# Patient Record
Sex: MALE | Race: WHITE | NOT HISPANIC OR LATINO | Employment: FULL TIME | URBAN - NONMETROPOLITAN AREA
[De-identification: names, ages, dates, MRNs, and addresses within clinical notes are randomized per-mention and may not be internally consistent; named-entity substitution may affect disease eponyms.]

---

## 2022-10-05 ENCOUNTER — OFFICE VISIT (OUTPATIENT)
Dept: INTERNAL MEDICINE | Facility: CLINIC | Age: 62
End: 2022-10-05

## 2022-10-05 VITALS
BODY MASS INDEX: 31.22 KG/M2 | HEART RATE: 72 BPM | SYSTOLIC BLOOD PRESSURE: 150 MMHG | HEIGHT: 68 IN | OXYGEN SATURATION: 97 % | DIASTOLIC BLOOD PRESSURE: 90 MMHG | WEIGHT: 206 LBS | TEMPERATURE: 98 F

## 2022-10-05 DIAGNOSIS — R94.31 ABNORMAL EKG: ICD-10-CM

## 2022-10-05 DIAGNOSIS — K51.919 ULCERATIVE COLITIS WITH COMPLICATION, UNSPECIFIED LOCATION: ICD-10-CM

## 2022-10-05 DIAGNOSIS — Z23 NEED FOR INFLUENZA VACCINATION: ICD-10-CM

## 2022-10-05 DIAGNOSIS — I10 PRIMARY HYPERTENSION: Primary | ICD-10-CM

## 2022-10-05 DIAGNOSIS — R06.09 DYSPNEA ON EXERTION: ICD-10-CM

## 2022-10-05 PROCEDURE — 90686 IIV4 VACC NO PRSV 0.5 ML IM: CPT | Performed by: FAMILY MEDICINE

## 2022-10-05 PROCEDURE — 90471 IMMUNIZATION ADMIN: CPT | Performed by: FAMILY MEDICINE

## 2022-10-05 PROCEDURE — 99204 OFFICE O/P NEW MOD 45 MIN: CPT | Performed by: FAMILY MEDICINE

## 2022-10-05 RX ORDER — LOSARTAN POTASSIUM 50 MG/1
50 TABLET ORAL DAILY
COMMUNITY
End: 2022-10-05 | Stop reason: SDUPTHER

## 2022-10-05 RX ORDER — LOSARTAN POTASSIUM 50 MG/1
50 TABLET ORAL DAILY
Qty: 90 TABLET | Refills: 1 | Status: SHIPPED | OUTPATIENT
Start: 2022-10-05 | End: 2023-02-12 | Stop reason: SDUPTHER

## 2022-10-05 RX ORDER — AMLODIPINE BESYLATE 10 MG/1
10 TABLET ORAL DAILY
Qty: 90 TABLET | Refills: 1 | Status: SHIPPED | OUTPATIENT
Start: 2022-10-05 | End: 2023-02-27

## 2022-10-05 RX ORDER — MESALAMINE 0.38 G/1
375 CAPSULE, EXTENDED RELEASE ORAL DAILY
Qty: 90 CAPSULE | Refills: 0 | Status: SHIPPED | OUTPATIENT
Start: 2022-10-05 | End: 2022-10-05 | Stop reason: SDUPTHER

## 2022-10-05 RX ORDER — AMLODIPINE BESYLATE 5 MG/1
5 TABLET ORAL DAILY
COMMUNITY
End: 2022-10-05 | Stop reason: SDUPTHER

## 2022-10-05 RX ORDER — MESALAMINE 0.38 G/1
375 CAPSULE, EXTENDED RELEASE ORAL DAILY
Qty: 30 CAPSULE | Refills: 0 | Status: SHIPPED | OUTPATIENT
Start: 2022-10-05 | End: 2022-11-23 | Stop reason: SDUPTHER

## 2022-10-05 RX ORDER — MESALAMINE 0.38 G/1
CAPSULE, EXTENDED RELEASE ORAL
COMMUNITY
Start: 2022-09-29 | End: 2022-10-05 | Stop reason: SDUPTHER

## 2022-10-05 RX ORDER — TADALAFIL 20 MG/1
20 TABLET ORAL DAILY PRN
COMMUNITY

## 2022-10-05 NOTE — PROGRESS NOTES
"Dakota Garcia is a 62 y.o. male.    Chief Complaint   Patient presents with   • Hypertension       HPI   Patient has hypertension.  They are taking Losartan, Metoprolol and amlodipine.  They have been compliant with medications.  The patient denies any side effects to the medication.  Blood pressure is not controlled in the office today.  Blood pressure has been running high at home.  They are following a low salt diet.  They are active.  He has headaches all the time and reports decreased stamina since starting metoprolol.  He states he was put on metoprolol due to \"bad EKG\" 2 years ago.  He denies chest pain.  Admits to feeling like heart races at times.  Will occasionally have lump in his throat when heart races and is  short of breath during episodes.  Sometimes will occur on exertion.      He has had several colonoscopies.  He was started on mesalamine for ulcerative colitis after his last colonoscopy.  Admits to diarrhea with UC flares. He has been out of the medication for a few weeks.  He does feel like the medication helps to control his symptoms.     The following portions of the patient's history were reviewed and updated as appropriate: allergies, current medications, past family history, past medical history, past social history, past surgical history and problem list.     Past Medical History:   Diagnosis Date   • HTN (hypertension)    • Ulcerative colitis (HCC)        Past Surgical History:   Procedure Laterality Date   • COLONOSCOPY     • VASECTOMY         Family History   Problem Relation Age of Onset   • Thyroid disease Father    • Other Maternal Grandfather         pheochromocytoma   • Stroke Maternal Grandfather        Social History     Socioeconomic History   • Marital status: Significant Other   Tobacco Use   • Smoking status: Never Smoker   • Smokeless tobacco: Never Used   Vaping Use   • Vaping Use: Never used   Substance and Sexual Activity   • Alcohol use: Yes     Comment: occasional 1-2 a " "month   • Drug use: Never   • Sexual activity: Yes     Partners: Female       No Known Allergies      Current Outpatient Medications:   •  amLODIPine (NORVASC) 10 MG tablet, Take 1 tablet by mouth Daily., Disp: 90 tablet, Rfl: 1  •  losartan (COZAAR) 50 MG tablet, Take 1 tablet by mouth Daily., Disp: 90 tablet, Rfl: 1  •  mesalamine (APRISO) 0.375 g 24 hr capsule, Take 1 capsule by mouth Daily., Disp: 30 capsule, Rfl: 0  •  Metoprolol Succinate 25 MG capsule extended-release 24 hour sprinkle, Take 25 mg by mouth Daily., Disp: 90 capsule, Rfl: 1  •  tadalafil (CIALIS) 20 MG tablet, Take 20 mg by mouth Daily As Needed for Erectile Dysfunction., Disp: , Rfl:     ROS    Review of Systems   Constitutional: Positive for fatigue. Negative for chills and fever.   HENT: Negative for congestion, postnasal drip and sore throat.    Eyes: Negative for discharge and itching.   Respiratory: Positive for shortness of breath. Negative for cough.    Cardiovascular: Positive for palpitations (rare). Negative for chest pain.   Gastrointestinal: Positive for diarrhea. Negative for abdominal pain, constipation, nausea and vomiting.   Endocrine: Negative for cold intolerance and heat intolerance.   Genitourinary: Negative for difficulty urinating and nocturia.   Musculoskeletal: Positive for arthralgias (work).   Skin: Negative for rash.   Allergic/Immunologic: Negative for environmental allergies.   Neurological: Positive for weakness and headache.   Hematological: Does not bruise/bleed easily.   Psychiatric/Behavioral: Negative for depressed mood. The patient is not nervous/anxious.        Vitals:    10/05/22 0915   BP: 150/90   Pulse: 72   Temp: 98 °F (36.7 °C)   SpO2: 97%   Weight: 93.4 kg (206 lb)   Height: 172.7 cm (68\")     Body mass index is 31.32 kg/m².    Physical Exam     Physical Exam  Constitutional:       General: He is not in acute distress.     Appearance: Normal appearance. He is well-developed.   HENT:      Head: " Normocephalic and atraumatic.      Right Ear: Tympanic membrane and external ear normal.      Left Ear: Tympanic membrane and external ear normal.   Eyes:      Extraocular Movements: Extraocular movements intact.      Conjunctiva/sclera: Conjunctivae normal.      Pupils: Pupils are equal, round, and reactive to light.   Cardiovascular:      Rate and Rhythm: Normal rate and regular rhythm.      Heart sounds: No murmur heard.  Pulmonary:      Effort: Pulmonary effort is normal. No respiratory distress.      Breath sounds: Normal breath sounds. No wheezing.   Abdominal:      General: Bowel sounds are normal. There is no distension.      Palpations: Abdomen is soft.      Tenderness: There is no abdominal tenderness.   Musculoskeletal:      Cervical back: Neck supple.      Right lower leg: No edema.      Left lower leg: No edema.   Lymphadenopathy:      Cervical: No cervical adenopathy.   Skin:     General: Skin is warm and dry.   Neurological:      Mental Status: He is alert and oriented to person, place, and time.      Cranial Nerves: No cranial nerve deficit.      Deep Tendon Reflexes: Reflexes normal.   Psychiatric:         Mood and Affect: Mood normal.         Behavior: Behavior normal.         Assessment/Plan    Problems Addressed this Visit     Primary hypertension - Primary     Uncontrolled.  Will continue metoprolol and losartan.  Increase amlodipine to 10mg daily.           Relevant Medications    amLODIPine (NORVASC) 10 MG tablet    losartan (COZAAR) 50 MG tablet    Metoprolol Succinate 25 MG capsule extended-release 24 hour sprinkle    Ulcerative colitis with complication (HCC)     Uncontrolled due to being out of medication.  Will refill mesalamine and refer to GI.          Relevant Orders    Ambulatory Referral to Gastroenterology    Dyspnea on exertion     [facility]  Will place a referral to cardiology for further evaluation as well.          Relevant Orders    Ambulatory  Referral to Cardiology      Other Visit Diagnoses     Need for influenza vaccination        Relevant Orders    FluLaval/Fluzone >6 mos ([id number]) (Completed)    Abnormal EKG        Relevant Orders    Ambulatory Referral to Cardiology     New Medications Ordered This Visit   Medications   • amLODIPine (NORVASC) 10 MG tablet     Sig: Take 1 tablet by mouth Daily.     Dispense:  90 tablet     Refill:  1   • losartan (COZAAR) 50 MG tablet     Sig: Take 1 tablet by mouth Daily.     Dispense:  90 tablet     Refill:  1   • Metoprolol Succinate 25 MG capsule extended-release 24 hour sprinkle     Sig: Take 25 mg by mouth Daily.     Dispense:  90 capsule     Refill:  1   • mesalamine (APRISO) 0.375 g 24 hr capsule     Sig: Take 1 capsule by mouth Daily.     Dispense:  30 capsule     Refill:  0       Orders Placed This Encounter   Procedures   • FluLaval/Fluzone >6 mos ([id number])       Return in about 1 month (around 11/5/2022) for HTN.      Rosie St, DO

## 2022-10-06 NOTE — ASSESSMENT & PLAN NOTE
[facility]  Will place a referral to cardiology for further evaluation as well.

## 2022-10-19 ENCOUNTER — OFFICE VISIT (OUTPATIENT)
Dept: INTERNAL MEDICINE | Facility: CLINIC | Age: 62
End: 2022-10-19

## 2022-10-19 ENCOUNTER — HOSPITAL ENCOUNTER (OUTPATIENT)
Dept: GENERAL RADIOLOGY | Facility: HOSPITAL | Age: 62
Discharge: HOME | End: 2022-10-19
Admitting: NURSE PRACTITIONER

## 2022-10-19 VITALS
BODY MASS INDEX: 30.77 KG/M2 | TEMPERATURE: 97.2 F | HEART RATE: 91 BPM | SYSTOLIC BLOOD PRESSURE: 140 MMHG | HEIGHT: 68 IN | OXYGEN SATURATION: 98 % | DIASTOLIC BLOOD PRESSURE: 82 MMHG | WEIGHT: 203 LBS

## 2022-10-19 DIAGNOSIS — R74.8 ELEVATED ALKALINE PHOSPHATASE LEVEL: ICD-10-CM

## 2022-10-19 DIAGNOSIS — R74.8 ELEVATED LIVER ENZYMES: ICD-10-CM

## 2022-10-19 DIAGNOSIS — R06.09 DYSPNEA ON EXERTION: ICD-10-CM

## 2022-10-19 DIAGNOSIS — J30.89 ENVIRONMENTAL AND SEASONAL ALLERGIES: ICD-10-CM

## 2022-10-19 DIAGNOSIS — R23.2 HOT FLASHES: ICD-10-CM

## 2022-10-19 DIAGNOSIS — I10 PRIMARY HYPERTENSION: Primary | ICD-10-CM

## 2022-10-19 DIAGNOSIS — Z13.0 SCREENING FOR DISORDER OF BLOOD AND BLOOD-FORMING ORGANS: ICD-10-CM

## 2022-10-19 DIAGNOSIS — R73.9 BLOOD GLUCOSE ELEVATED: ICD-10-CM

## 2022-10-19 DIAGNOSIS — Z13.220 SCREENING FOR LIPID DISORDERS: ICD-10-CM

## 2022-10-19 LAB
EXPIRATION DATE: NORMAL
HBA1C MFR BLD: 5.4 %
Lab: NORMAL

## 2022-10-19 PROCEDURE — 71046 X-RAY EXAM CHEST 2 VIEWS: CPT

## 2022-10-19 PROCEDURE — 99214 OFFICE O/P EST MOD 30 MIN: CPT | Performed by: NURSE PRACTITIONER

## 2022-10-19 PROCEDURE — 83036 HEMOGLOBIN GLYCOSYLATED A1C: CPT | Performed by: NURSE PRACTITIONER

## 2022-10-19 RX ORDER — FLUTICASONE PROPIONATE 50 MCG
2 SPRAY, SUSPENSION (ML) NASAL DAILY
Qty: 16 G | Refills: 11 | Status: SHIPPED | OUTPATIENT
Start: 2022-10-19

## 2022-10-20 LAB
ALBUMIN SERPL-MCNC: 3.8 G/DL (ref 3.5–5.2)
ALBUMIN/GLOB SERPL: 1.3 G/DL
ALP SERPL-CCNC: 146 U/L (ref 39–117)
ALT SERPL-CCNC: 122 U/L (ref 1–41)
AST SERPL-CCNC: 72 U/L (ref 1–40)
BILIRUB SERPL-MCNC: 0.6 MG/DL (ref 0–1.2)
BUN SERPL-MCNC: 10 MG/DL (ref 8–23)
BUN/CREAT SERPL: 10.3 (ref 7–25)
CALCIUM SERPL-MCNC: 8.7 MG/DL (ref 8.6–10.5)
CHLORIDE SERPL-SCNC: 106 MMOL/L (ref 98–107)
CHOLEST SERPL-MCNC: 155 MG/DL (ref 0–200)
CO2 SERPL-SCNC: 22.6 MMOL/L (ref 22–29)
CREAT SERPL-MCNC: 0.97 MG/DL (ref 0.76–1.27)
DIFFERENTIAL COMMENT: ABNORMAL
EGFRCR SERPLBLD CKD-EPI 2021: 88.3 ML/MIN/1.73
EOSINOPHIL # BLD MANUAL: 0.18 10*3/MM3 (ref 0–0.4)
EOSINOPHIL NFR BLD MANUAL: 2.3 % (ref 0.3–6.2)
ERYTHROCYTE [DISTWIDTH] IN BLOOD BY AUTOMATED COUNT: 14.3 % (ref 12.3–15.4)
ESTROGEN SERPL-MCNC: 543 PG/ML (ref 56–213)
GLOBULIN SER CALC-MCNC: 2.9 GM/DL
GLUCOSE SERPL-MCNC: 90 MG/DL (ref 65–99)
HCT VFR BLD AUTO: 40.7 % (ref 37.5–51)
HDLC SERPL-MCNC: 30 MG/DL (ref 40–60)
HGB BLD-MCNC: 13.6 G/DL (ref 13–17.7)
LDLC SERPL CALC-MCNC: 91 MG/DL (ref 0–100)
LYMPHOCYTES # BLD MANUAL: 2.61 10*3/MM3 (ref 0.7–3.1)
LYMPHOCYTES NFR BLD MANUAL: 33 % (ref 19.6–45.3)
MCH RBC QN AUTO: 28.1 PG (ref 26.6–33)
MCHC RBC AUTO-ENTMCNC: 33.4 G/DL (ref 31.5–35.7)
MCV RBC AUTO: 84.1 FL (ref 79–97)
MONOCYTES # BLD MANUAL: 0.36 10*3/MM3 (ref 0.1–0.9)
MONOCYTES NFR BLD MANUAL: 4.5 % (ref 5–12)
NEUTROPHILS # BLD MANUAL: 4.77 10*3/MM3 (ref 1.7–7)
NEUTROPHILS NFR BLD MANUAL: 60.2 % (ref 42.7–76)
PLATELET # BLD AUTO: 273 10*3/MM3 (ref 140–450)
PLATELET BLD QL SMEAR: ABNORMAL
POTASSIUM SERPL-SCNC: 4.3 MMOL/L (ref 3.5–5.2)
PROT SERPL-MCNC: 6.7 G/DL (ref 6–8.5)
RBC # BLD AUTO: 4.84 10*6/MM3 (ref 4.14–5.8)
RBC MORPH BLD: ABNORMAL
SODIUM SERPL-SCNC: 138 MMOL/L (ref 136–145)
T4 FREE SERPL-MCNC: 1.4 NG/DL (ref 0.93–1.7)
TESTOST SERPL-MCNC: 606 NG/DL (ref 264–916)
THYROPEROXIDASE AB SERPL-ACNC: 9 IU/ML (ref 0–34)
TRIGL SERPL-MCNC: 195 MG/DL (ref 0–150)
TSH SERPL DL<=0.005 MIU/L-ACNC: 1.2 UIU/ML (ref 0.27–4.2)
VLDLC SERPL CALC-MCNC: 34 MG/DL (ref 5–40)
WBC # BLD AUTO: 7.92 10*3/MM3 (ref 3.4–10.8)

## 2022-10-27 ENCOUNTER — LAB (OUTPATIENT)
Dept: LAB | Facility: HOSPITAL | Age: 62
End: 2022-10-27

## 2022-10-27 PROCEDURE — 82977 ASSAY OF GGT: CPT | Performed by: NURSE PRACTITIONER

## 2022-10-27 PROCEDURE — 83615 LACTATE (LD) (LDH) ENZYME: CPT | Performed by: NURSE PRACTITIONER

## 2022-11-02 ENCOUNTER — OFFICE VISIT (OUTPATIENT)
Dept: INTERNAL MEDICINE | Facility: CLINIC | Age: 62
End: 2022-11-02

## 2022-11-02 VITALS
OXYGEN SATURATION: 97 % | DIASTOLIC BLOOD PRESSURE: 84 MMHG | SYSTOLIC BLOOD PRESSURE: 140 MMHG | TEMPERATURE: 98.8 F | WEIGHT: 200 LBS | HEIGHT: 67 IN | BODY MASS INDEX: 31.39 KG/M2 | HEART RATE: 78 BPM

## 2022-11-02 DIAGNOSIS — I10 PRIMARY HYPERTENSION: ICD-10-CM

## 2022-11-02 DIAGNOSIS — R73.9 HYPERGLYCEMIA: ICD-10-CM

## 2022-11-02 DIAGNOSIS — R74.8 ELEVATED LIVER ENZYMES: Primary | ICD-10-CM

## 2022-11-02 PROCEDURE — 99214 OFFICE O/P EST MOD 30 MIN: CPT | Performed by: FAMILY MEDICINE

## 2022-11-02 NOTE — ASSESSMENT & PLAN NOTE
Uncontrolled.  Advised to start metoprolol extended release and discontinue previous metoprolol.  Advised may take all blood pressure medications in the morning.  He will continue current dosage of losartan and 10 mg of amlodipine.  Encouraged to continue to monitor blood pressure.  If blood pressure remains consistently elevated, will increase losartan to 100 mg.

## 2022-11-02 NOTE — PROGRESS NOTES
Dakota Garcia is a 62 y.o. male.    Chief Complaint   Patient presents with   • Hypertension       HPI   Patient has hypertension.  They are taking metoprolol twice a day, amlodipine, and losartan.  Losartan has been taken at night.  He had started 2 mg of amlodipine, stopped, and then restarted back on the higher dosage.  He has not started the extended release metoprolol just yet.  Still taking previous dosage of metoprolol.  The patient denies any side effects to the medication.  Blood pressure is not controlled in the office today.  Blood pressure has been running slightly elevated at home.  They are following a low salt diet.  They are active.    Patient also has had elevated glucose readings.  Glucose has been as high as 200s.  Patient significant other has been monitoring his diet closely.  Glucose has come down mid 100s lately with dietary control.  He is less fatigued with improved glucose readings.  Previous A1c was unremarkable.      Patient was noted to have elevated liver enzymes on recent labs.  This was significantly increased from [facility] couple [facility].  Liver enzymes were normal at that time.  Follow-up labs revealed a minimally elevated LDH with normal GGT.  Patient does have ulcerative colitis and has been previously referred to gastroenterology.  He was unable to get an appointment scheduled until January.  As result, he did not follow through with scheduling.    Patient continues to have some chest pain and shortness of breath.  Has an upcoming appointment scheduled with cardiology.    The following portions of the patient's history were reviewed and updated as appropriate: allergies, current medications, past family history, past medical history, past social history, past surgical history and problem list.     Allergies   Allergen Reactions   • Lisinopril Cough         Current Outpatient Medications:   •  amLODIPine (NORVASC) 10 MG tablet, Take 1 tablet by  "mouth Daily., Disp: 90 tablet, Rfl: 1  •  fluticasone (Flonase) 50 MCG/ACT nasal spray, 2 sprays into the nostril(s) as directed by provider Daily., Disp: 16 g, Rfl: 11  •  losartan (COZAAR) 50 MG tablet, Take 1 tablet by mouth Daily., Disp: 90 tablet, Rfl: 1  •  Metoprolol Succinate 25 MG capsule extended-release 24 hour sprinkle, Take 25 mg by mouth Daily., Disp: 90 capsule, Rfl: 1  •  tadalafil (CIALIS) 20 MG tablet, Take 20 mg by mouth Daily As Needed for Erectile Dysfunction., Disp: , Rfl:   •  mesalamine (APRISO) 0.375 g 24 hr capsule, Take 1 capsule by mouth Daily., Disp: 30 capsule, Rfl: 0    ROS    Review of Systems   Constitutional: Positive for fatigue. Negative for fever.   Respiratory: Positive for cough and shortness of breath.    Cardiovascular: Positive for chest pain (on exertion).   Gastrointestinal: Negative for abdominal pain, blood in stool, constipation, diarrhea, nausea and vomiting.       Vitals:    11/02/22 0953   BP: 140/84   BP Location: Right arm   Patient Position: Sitting   Cuff Size: Adult   Pulse: 78   Temp: 98.8 °F (37.1 °C)   SpO2: 97%   Weight: 90.7 kg (200 lb)   Height: 170.2 cm (67\")   PainSc: 0-No pain     Body mass index is 31.32 kg/m².    Physical Exam     Physical Exam  Constitutional:       General: He is not in acute distress.     Appearance: Normal appearance. He is well-developed.   HENT:      Head: Normocephalic and atraumatic.      Right Ear: Tympanic membrane and external ear normal.      Left Ear: Tympanic membrane and external ear normal.      Mouth/Throat:      Pharynx: Posterior oropharyngeal erythema (Postnasal drip to) present.   Eyes:      Extraocular Movements: Extraocular movements intact.      Conjunctiva/sclera: Conjunctivae normal.   Cardiovascular:      Rate and Rhythm: Normal rate and regular rhythm.      Heart sounds: No murmur heard.  Pulmonary:      Effort: Pulmonary effort is normal. No respiratory distress.      Breath sounds: Normal breath sounds. " No wheezing.   Abdominal:      General: Bowel sounds are normal. There is no distension.      Palpations: Abdomen is soft.      Tenderness: There is no abdominal tenderness.   Musculoskeletal:      Cervical back: Neck supple.   Lymphadenopathy:      Cervical: No cervical adenopathy.   Skin:     General: Skin is warm and dry.   Neurological:      Mental Status: He is alert and oriented to person, place, and time.      Cranial Nerves: No cranial nerve deficit.   Psychiatric:         Mood and Affect: Mood normal.         Behavior: Behavior normal.         Assessment/Plan    Problems Addressed this Visit     Primary hypertension     Uncontrolled.  Advised to start metoprolol extended release and discontinue previous metoprolol.  Advised may take all blood pressure medications in the morning.  He will continue current dosage of losartan and 10 mg of amlodipine.  Encouraged to continue to monitor blood pressure.  If blood pressure remains consistently elevated, will increase losartan to 100 mg.         Elevated liver enzymes - Primary     Reviewed lab results with patient today.  Discussed with minimally elevated LDH, may be lab error, hemolyzed specimen, acute illness.  He denies any use of alcohol.  We will obtain an ultrasound of the liver for further evaluation.  In addition, he has been scheduled with gastroenterology for 2 weeks from now.  Appointment information provided at visit today.         Relevant Orders    US Liver    Hyperglycemia     Improving with dietary control.  Continue to monitor diet and avoid high sugar high carb meals.          No orders of the defined types were placed in this encounter.      No orders of the defined types were placed in this encounter.      Return in about 1 month (around 12/2/2022) for HTN, glucose.    Rosie St DO

## 2022-11-02 NOTE — ASSESSMENT & PLAN NOTE
Reviewed lab results with patient today.  Discussed with minimally elevated LDH, may be lab error, hemolyzed specimen, acute illness.  He denies any use of alcohol.  We will obtain an ultrasound of the liver for further evaluation.  In addition, he has been scheduled with gastroenterology for 2 weeks from now.  Appointment information provided at visit today.

## 2022-11-16 ENCOUNTER — OFFICE VISIT (OUTPATIENT)
Dept: CARDIOLOGY | Facility: CLINIC | Age: 62
End: 2022-11-16

## 2022-11-16 VITALS
BODY MASS INDEX: 31.23 KG/M2 | WEIGHT: 199 LBS | DIASTOLIC BLOOD PRESSURE: 80 MMHG | SYSTOLIC BLOOD PRESSURE: 130 MMHG | OXYGEN SATURATION: 96 % | HEIGHT: 67 IN | HEART RATE: 70 BPM

## 2022-11-16 DIAGNOSIS — I10 PRIMARY HYPERTENSION: ICD-10-CM

## 2022-11-16 DIAGNOSIS — E78.2 MIXED HYPERLIPIDEMIA: ICD-10-CM

## 2022-11-16 DIAGNOSIS — I20.9 ANGINA PECTORIS: Primary | ICD-10-CM

## 2022-11-16 PROCEDURE — 93000 ELECTROCARDIOGRAM COMPLETE: CPT | Performed by: INTERNAL MEDICINE

## 2022-11-16 PROCEDURE — 99204 OFFICE O/P NEW MOD 45 MIN: CPT | Performed by: INTERNAL MEDICINE

## 2022-11-16 NOTE — PROGRESS NOTES
[facility] Cardiology  Consultation H&P  Dakota Garcia  1960  [address] [facility] [place] [address]     VISIT DATE:  11/16/22    PCP: Rosie tS DO  [address]  [place] [id number]    IDENTIFICATION: A 62 y.o. male [employer] ,  for [employer]    PROBLEM LIST:  BOUDRE[place]  HL 10/22 155/195/30/91-statin intolerant  HTN  Ulcerative colitis      CC:  Chief Complaint   Patient presents with   • Shortness of Breath     consult       Allergies  Allergies   Allergen Reactions   • Lisinopril Cough   • Demerol [Meperidine] Nausea And Vomiting       Current Medications    Current Outpatient Medications:   •  amLODIPine (NORVASC) 10 MG tablet, Take 1 tablet by mouth Daily., Disp: 90 tablet, Rfl: 1  •  fluticasone (Flonase) 50 MCG/ACT nasal spray, 2 sprays into the nostril(s) as directed by provider Daily., Disp: 16 g, Rfl: 11  •  losartan (COZAAR) 50 MG tablet, Take 1 tablet by mouth Daily., Disp: 90 tablet, Rfl: 1  •  mesalamine (APRISO) 0.375 g 24 hr capsule, Take 1 capsule by mouth Daily. (Patient taking differently: Take 375 mg by mouth Daily. 4 capsules qd), Disp: 30 capsule, Rfl: 0  •  Metoprolol Succinate 25 MG capsule extended-release 24 hour sprinkle, Take 25 mg by mouth Daily., Disp: 90 capsule, Rfl: 1  •  tadalafil (CIALIS) 20 MG tablet, Take 20 mg by mouth Daily As Needed for Erectile Dysfunction., Disp: , Rfl:      History of Present Illness   HPI  Dakota Garcia is a 62 y.o. year old male with the above mentioned PMH who presents for consult from Rosie St DO for evaluation of dyspnea.   Patient notes exertional dyspnea with functional decline over the last 1 year.  Specifically anecdotes when he is carrying ladders at work and or moving boxes most recently notes substantial shortness of breath.  He has had no palpitations presyncope.  Has been compliant with his medications.      ROS  Review of Systems   Constitutional: Negative for chills, fever,  malaise/fatigue, night sweats, weight gain and weight loss.   HENT: Negative for hearing loss and nosebleeds.    Eyes: Negative for blurred vision, vision loss in left eye, vision loss in right eye, visual disturbance and visual halos.   Cardiovascular: Positive for dyspnea on exertion. Negative for chest pain, claudication, cyanosis, irregular heartbeat, leg swelling, near-syncope, orthopnea, palpitations, paroxysmal nocturnal dyspnea and syncope.   Respiratory: Positive for shortness of breath. Negative for cough, hemoptysis, snoring and wheezing.    Endocrine: Negative for cold intolerance, heat intolerance, polydipsia, polyphagia and polyuria.   Hematologic/Lymphatic: Negative for adenopathy and bleeding problem. Does not bruise/bleed easily.   Skin: Negative for dry skin, poor wound healing and rash.   Musculoskeletal: Negative for falls, joint pain, joint swelling, muscle cramps, muscle weakness, myalgias and neck pain.   Gastrointestinal: Negative for bloating, abdominal pain, change in bowel habit, bowel incontinence, constipation, diarrhea, dysphagia, excessive appetite, heartburn, hematemesis, hematochezia, jaundice, melena, nausea and vomiting.   Genitourinary: Negative for bladder incontinence, dysuria, flank pain, hematuria, hesitancy and nocturia.   Neurological: Negative for aphonia, excessive daytime sleepiness, dizziness, focal weakness, headaches, light-headedness, loss of balance, seizures, sensory change, tremors, vertigo and weakness.   Psychiatric/Behavioral: Negative for altered mental status, depression, memory loss, substance abuse and suicidal ideas. The patient is not nervous/anxious.        SOCIAL HX  Social History     Socioeconomic History   • Marital status:    Tobacco Use   • Smoking status: Never   • Smokeless tobacco: Never   Vaping Use   • Vaping Use: Never used   Substance and Sexual Activity   • Alcohol use: Yes     Comment: occasional 1-2 a month   • Drug use: Never   •  "Sexual activity: Yes     Partners: Female       FAMILY HX  Family History   Problem Relation Age of Onset   • Thyroid disease Father    • Other Maternal Grandfather         pheochromocytoma   • Stroke Maternal Grandfather        Vitals:    11/16/22 0900   BP: 130/80   BP Location: Right arm   Patient Position: Sitting   Pulse: 70   SpO2: 96%   Weight: 90.3 kg (199 lb)   Height: 170.2 cm (67\")     Body mass index is 31.17 kg/m².     PHYSICAL EXAMINATION:  Constitutional:       Appearance: Healthy appearance. Not in distress.   Neck:      Vascular: No JVR. JVD normal.   Pulmonary:      Effort: Pulmonary effort is normal.      Breath sounds: Normal breath sounds. No wheezing. No rhonchi. No rales.   Chest:      Chest wall: Not tender to palpatation.   Cardiovascular:      PMI at left midclavicular line. Normal rate. Regular rhythm. Normal S1. Normal S2.      Murmurs: There is no murmur.      No gallop. No click. No rub.   Pulses:     Intact distal pulses.   Edema:     Peripheral edema absent.   Abdominal:      General: Bowel sounds are normal.      Palpations: Abdomen is soft.      Tenderness: There is no abdominal tenderness.   Musculoskeletal: Normal range of motion.         General: No tenderness. Skin:     General: Skin is warm and dry.   Neurological:      General: No focal deficit present.      Mental Status: Alert and oriented to person, place and time.         Diagnostic Data:    ECG 12 Lead    Date/Time: 11/16/2022 9:16 AM  Performed by: Jun Hidalgo MD  Authorized by: Jun Hidalgo MD   Previous ECG: no previous ECG available  Rhythm: sinus rhythm  BPM: 62    Clinical impression: non-specific ECG             Lab Results   Component Value Date    CHLPL 155 10/19/2022    TRIG 195 (H) 10/19/2022    HDL 30 (L) 10/19/2022     Lab Results   Component Value Date    GLUCOSE 90 10/19/2022    BUN 10 10/19/2022    CREATININE 0.97 10/19/2022     10/19/2022    K 4.3 10/19/2022     10/19/2022    CO2 22.6 " 10/19/2022     Lab Results   Component Value Date    HGBA1C 5.4 10/19/2022     Lab Results   Component Value Date    WBC 7.92 10/19/2022    HGB 13.6 10/19/2022    HCT 40.7 10/19/2022     10/19/2022       ASSESSMENT:   Diagnosis Plan   1. Angina pectoris (HCC)        2. Primary hypertension        3. Mixed hyperlipidemia            PLAN:  Angina-risk stratification with perfusion stress testing.    Hypertension controlled metoprolol amlodipine losartan    Mixed dyslipidemia historically statin intolerant we will follow-up calcium scoring on stress testing discussed potential [id number]        Rosie St DO, thank you for referring Mr. Garcia for evaluation.  I have forwarded my electronically generated recommendations to you for review.  Please do not hesitate to call with any questions.      Jun Hidalgo MD, [facility]

## 2022-11-23 ENCOUNTER — OFFICE VISIT (OUTPATIENT)
Dept: GASTROENTEROLOGY | Facility: CLINIC | Age: 62
End: 2022-11-23

## 2022-11-23 VITALS
HEART RATE: 66 BPM | WEIGHT: 201.8 LBS | SYSTOLIC BLOOD PRESSURE: 125 MMHG | BODY MASS INDEX: 31.67 KG/M2 | HEIGHT: 67 IN | DIASTOLIC BLOOD PRESSURE: 53 MMHG | TEMPERATURE: 98.4 F

## 2022-11-23 DIAGNOSIS — K51.90 ULCERATIVE COLITIS WITHOUT COMPLICATIONS, UNSPECIFIED LOCATION: Primary | ICD-10-CM

## 2022-11-23 PROCEDURE — 99213 OFFICE O/P EST LOW 20 MIN: CPT | Performed by: NURSE PRACTITIONER

## 2022-11-23 RX ORDER — MESALAMINE 0.38 G/1
375 CAPSULE, EXTENDED RELEASE ORAL DAILY
Qty: 360 CAPSULE | Refills: 3 | Status: SHIPPED | OUTPATIENT
Start: 2022-11-23

## 2022-11-23 NOTE — PROGRESS NOTES
GASTROENTEROLOGY OFFICE NOTE  Dakota Garcia  [id number]  1960    CARE TEAM  Patient Care Team:  Rosie St DO as PCP - General (Family Medicine)  Jun Hidalgo MD as Consulting Physician (Cardiology)    Referring Provider: Rosie St DO    Chief Complaint   Patient presents with   • Elevated Hepatic Enzymes        HISTORY OF PRESENT ILLNESS:  Patient is a 62-year-old male with a history of [facility]ative [facility].  He reports that his symptoms first began 6 years ago, having bloody diarrhea and abdominal pain and weight loss of almost 200 pounds over 5 months time.  [facility] [place], he reports that he was given IV antibiotics, tested for infectious processes and recommended to have a colonoscopy on outpatient basis however, he was never able to get this scheduled.  Nonetheless, he continued with diarrhea over the years.  He moved to [place] and went [facility] [place] [facility], colonoscopy was performed by Dr. Comer and he was found to have ulcerative colitis.  To his recollection, inflammation was noted in the first 12 to 14 cm of the sigmoid colon.  He was started on mesalamine and has done very well.  He wishes to [facility] for [facility] as he reports that he has a hard time getting refills of his mesalamine and often times he will run out for up to 2 weeks before a refill is approved.    PAST MEDICAL HISTORY  Past Medical History:   Diagnosis Date   • HTN (hypertension)    • Ulcerative colitis (HCC)         PAST SURGICAL HISTORY  Past Surgical History:   Procedure Laterality Date   • COLONOSCOPY     • VASECTOMY          MEDICATIONS:    Current Outpatient Medications:   •  amLODIPine (NORVASC) 10 MG tablet, Take 1 tablet by mouth Daily., Disp: 90 tablet, Rfl: 1  •  fluticasone (Flonase) 50 MCG/ACT nasal spray, 2 sprays into the nostril(s) as directed by provider Daily., Disp:  "16 g, Rfl: 11  •  losartan (COZAAR) 50 MG tablet, Take 1 tablet by mouth Daily., Disp: 90 tablet, Rfl: 1  •  mesalamine (APRISO) 0.375 g 24 hr capsule, Take 1 capsule by mouth Daily. 4 capsules qd, Disp: 360 capsule, Rfl: 3  •  Metoprolol Succinate 25 MG capsule extended-release 24 hour sprinkle, Take 25 mg by mouth Daily., Disp: 90 capsule, Rfl: 1  •  tadalafil (CIALIS) 20 MG tablet, Take 20 mg by mouth Daily As Needed for Erectile Dysfunction., Disp: , Rfl:     ALLERGIES  Allergies   Allergen Reactions   • Lisinopril Cough   • Demerol [Meperidine] Nausea And Vomiting       FAMILY HISTORY:  Family History   Problem Relation Age of Onset   • Thyroid disease Father    • Other Maternal Grandfather         pheochromocytoma   • Stroke Maternal Grandfather    • Colon cancer Neg Hx        SOCIAL HISTORY  Social History     Socioeconomic History   • Marital status:    Tobacco Use   • Smoking status: Never   • Smokeless tobacco: Never   Vaping Use   • Vaping Use: Never used   Substance and Sexual Activity   • Alcohol use: Yes     Comment: occasional 1-2 a month   • Drug use: Never   • Sexual activity: Yes     Partners: Female         PHYSICAL EXAM   /53 (BP Location: Left arm, Patient Position: Sitting, Cuff Size: Adult)   Pulse 66   Temp 98.4 °F (36.9 °C) (Temporal)   Ht 170.2 cm (67\")   Wt 91.5 kg (201 lb 12.8 oz)   BMI 31.61 kg/m²   Physical Exam  Constitutional:       Appearance: Normal appearance.   HENT:      Head: Normocephalic and atraumatic.   Pulmonary:      Effort: Pulmonary effort is normal.   Abdominal:      General: There is no distension.      Palpations: Abdomen is soft.      Tenderness: There is no abdominal tenderness.   Neurological:      Mental Status: He is alert and oriented to person, place, and time.   Psychiatric:         Mood and Affect: Mood normal.         Thought Content: Thought content normal.           ASSESSMENT / PLAN  1.  Ulcerative colitis  - Apriso 0.375 g, take 4 " capsules daily  - Obtain records from [facility] in [place],: Colonoscopy report and pathology    Return in about 6 months (around 5/23/2023).    I discussed the patients findings and my recommendations with patient    Lamine Carranza, APRN

## 2022-12-07 ENCOUNTER — HOSPITAL ENCOUNTER (OUTPATIENT)
Dept: CARDIOLOGY | Facility: HOSPITAL | Age: 62
Discharge: HOME | End: 2022-12-07
Admitting: INTERNAL MEDICINE

## 2022-12-07 DIAGNOSIS — I20.9 ANGINA PECTORIS: ICD-10-CM

## 2022-12-07 PROCEDURE — 78431 MYOCRD IMG PET RST&STRS CT: CPT

## 2022-12-07 PROCEDURE — 93017 CV STRESS TEST TRACING ONLY: CPT

## 2022-12-07 PROCEDURE — 93018 CV STRESS TEST I&R ONLY: CPT | Performed by: INTERNAL MEDICINE

## 2022-12-07 PROCEDURE — 78431 MYOCRD IMG PET RST&STRS CT: CPT | Performed by: INTERNAL MEDICINE

## 2022-12-07 PROCEDURE — 0 RUBIDIUM CHLORIDE: Performed by: INTERNAL MEDICINE

## 2022-12-07 PROCEDURE — A9555 RB82 RUBIDIUM: HCPCS | Performed by: INTERNAL MEDICINE

## 2022-12-07 PROCEDURE — 25010000002 REGADENOSON 0.4 MG/5ML SOLUTION: Performed by: INTERNAL MEDICINE

## 2022-12-07 RX ADMIN — RUBIDIUM CHLORIDE RB-82 1 DOSE: 150 INJECTION, SOLUTION INTRAVENOUS at 10:29

## 2022-12-07 RX ADMIN — RUBIDIUM CHLORIDE RB-82 1 DOSE: 150 INJECTION, SOLUTION INTRAVENOUS at 10:16

## 2022-12-07 RX ADMIN — REGADENOSON 0.4 MG: 0.08 INJECTION, SOLUTION INTRAVENOUS at 10:28

## 2022-12-08 ENCOUNTER — TELEPHONE (OUTPATIENT)
Dept: CARDIOLOGY | Facility: CLINIC | Age: 62
End: 2022-12-08

## 2022-12-08 LAB
BH CV REST NUCLEAR ISOTOPE DOSE: 30.1 MCI
BH CV STRESS BP STAGE 1: NORMAL
BH CV STRESS BP STAGE 3: NORMAL
BH CV STRESS COMMENTS STAGE 1: NORMAL
BH CV STRESS DOSE REGADENOSON STAGE 1: 0.4
BH CV STRESS DURATION MIN STAGE 1: 1
BH CV STRESS DURATION MIN STAGE 2: 1
BH CV STRESS DURATION MIN STAGE 3: 1
BH CV STRESS DURATION MIN STAGE 4: 1
BH CV STRESS DURATION SEC STAGE 2: 0
BH CV STRESS HR STAGE 1: 77
BH CV STRESS HR STAGE 2: 82
BH CV STRESS HR STAGE 3: 77
BH CV STRESS HR STAGE 4: 76
BH CV STRESS NUCLEAR ISOTOPE DOSE: 29.8 MCI
BH CV STRESS O2 STAGE 1: 99
BH CV STRESS O2 STAGE 2: 99
BH CV STRESS O2 STAGE 3: 99
BH CV STRESS O2 STAGE 4: 99
BH CV STRESS PROTOCOL 1: NORMAL
BH CV STRESS RECOVERY BP: NORMAL MMHG
BH CV STRESS RECOVERY HR: 68 BPM
BH CV STRESS RECOVERY O2: 98 %
BH CV STRESS STAGE 1: 1
BH CV STRESS STAGE 2: 2
BH CV STRESS STAGE 3: 3
BH CV STRESS STAGE 4: 4
MAXIMAL PREDICTED HEART RATE: 158 BPM
PERCENT MAX PREDICTED HR: 55.7 %
STRESS BASELINE BP: NORMAL MMHG
STRESS BASELINE HR: 72 BPM
STRESS O2 SAT REST: 96 %
STRESS PERCENT HR: 66 %
STRESS POST ESTIMATED WORKLOAD: 1 METS
STRESS POST EXERCISE DUR MIN: 4 MIN
STRESS POST EXERCISE DUR SEC: 0 SEC
STRESS POST O2 SAT PEAK: 99 %
STRESS POST PEAK BP: NORMAL MMHG
STRESS POST PEAK HR: 88 BPM
STRESS TARGET HR: 134 BPM

## 2022-12-08 NOTE — TELEPHONE ENCOUNTER
Per J -   Stress test low risk   Normal LV function  No coronary calcium - no need for lipid treatment at this time.    Unable to reach pt at both numbers. Will mail letter with results

## 2022-12-28 ENCOUNTER — HOSPITAL ENCOUNTER (OUTPATIENT)
Dept: ULTRASOUND IMAGING | Facility: HOSPITAL | Age: 62
End: 2022-12-28

## 2023-01-18 ENCOUNTER — PATIENT MESSAGE (OUTPATIENT)
Dept: INTERNAL MEDICINE | Facility: CLINIC | Age: 63
End: 2023-01-18
Payer: COMMERCIAL

## 2023-01-20 NOTE — TELEPHONE ENCOUNTER
From: Dakota Garcia  To: Rosie St  Sent: 1/18/2023 10:30 AM EST  Subject: metoprolol    this is for dr St or [facility] only. I'm out of my metoprolol completely. I've submitted refils but keep getting messages from [facility] they need information from my dr. can you tell me what's going on?

## 2023-01-30 ENCOUNTER — TELEPHONE (OUTPATIENT)
Dept: INTERNAL MEDICINE | Facility: CLINIC | Age: 63
End: 2023-01-30
Payer: COMMERCIAL

## 2023-01-31 ENCOUNTER — TELEPHONE (OUTPATIENT)
Dept: INTERNAL MEDICINE | Facility: CLINIC | Age: 63
End: 2023-01-31
Payer: COMMERCIAL

## 2023-01-31 RX ORDER — METOPROLOL SUCCINATE 25 MG/1
25 TABLET, EXTENDED RELEASE ORAL DAILY
Qty: 90 TABLET | Refills: 1 | Status: SHIPPED | OUTPATIENT
Start: 2023-01-31

## 2023-01-31 RX ORDER — METOPROLOL SUCCINATE 25 MG/1
25 TABLET, EXTENDED RELEASE ORAL DAILY
Qty: 90 TABLET | Refills: 1 | Status: SHIPPED | OUTPATIENT
Start: 2023-01-31 | End: 2023-01-31 | Stop reason: SDUPTHER

## 2023-01-31 NOTE — TELEPHONE ENCOUNTER
Spoke with significant other Good via verbal release about the medication issue that the pt is having. Good states that out has been out of medication for three weeks now and needs the medication send to [facility] in [place], [place] instead of [facility]. She request at least 30 days supply

## 2023-01-31 NOTE — TELEPHONE ENCOUNTER
Rx Refill Note  Requested Prescriptions     Pending Prescriptions Disp Refills   • Metoprolol Succinate 25 MG capsule extended-release 24 hour sprinkle 30 capsule 0     Sig: Take 25 mg by mouth Daily.      Last office visit with prescribing clinician: 11/2/2022   Last telemedicine visit with prescribing clinician: Visit date not found   Next office visit with prescribing clinician: Visit date not found       Carline Ricardo MA  01/31/23, 11:40 EST

## 2023-01-31 NOTE — TELEPHONE ENCOUNTER
"Called pt to discuss medication issue. The pt was aggressive and yelling as soon as I stated my name and what office I was calling from. Pt told me of the mix up and how the medication should have went to [facility] instead of his mail in pharmacy. Pt started asking about money asking who was going to pay him? I ask what he meant pt stated he paid $98 for what he thought was his metoprolol but was his IBS medication and he demanded payment. I explain I did not know but I would talk to his PCP for him. I informed him that the correct medication was sent to the right pharmacy he requested. I went over the medication with him to make sure it was correct. Pt also wanted to make sure his medication was still at the [facility] and still receive that as well, I told him I believed so but I would double check everything for him. Pt  stated he was going to the pharmacy in 1 hour, and if the medication was not there he would drive up to the office and tell every pt in the office hear about \"how [facility] does not care about its patients, and they will hear me\" . I apologized on behalf of [facility] that we were sorry for all the inconvenience this has caused, pt said \" I am so sick of hear sorry it makes me want to vomit\". I told the pt I would talk with Dr. St and if he had any more issues he could call us back here at the office.  "

## 2023-02-13 RX ORDER — METOPROLOL SUCCINATE 25 MG/1
25 TABLET, EXTENDED RELEASE ORAL DAILY
Qty: 90 TABLET | Refills: 1 | OUTPATIENT
Start: 2023-02-13

## 2023-02-13 RX ORDER — LOSARTAN POTASSIUM 50 MG/1
50 TABLET ORAL DAILY
Qty: 90 TABLET | Refills: 1 | Status: SHIPPED | OUTPATIENT
Start: 2023-02-13

## 2023-02-13 NOTE — TELEPHONE ENCOUNTER
Rx Refill Note  Requested Prescriptions     Pending Prescriptions Disp Refills   • metoprolol succinate XL (Toprol XL) 25 MG 24 hr tablet 90 tablet 1     Sig: Take 1 tablet by mouth Daily.      Last office visit with prescribing clinician: 11/2/2022   Last telemedicine visit with prescribing clinician:   Next office visit with prescribing clinician: 2/12/2023       Carline Ricardo MA  02/13/23, 13:20 EST

## 2023-02-27 RX ORDER — AMLODIPINE BESYLATE 10 MG/1
TABLET ORAL
Qty: 90 TABLET | Refills: 1 | Status: SHIPPED | OUTPATIENT
Start: 2023-02-27

## 2023-02-27 NOTE — TELEPHONE ENCOUNTER
Rx Refill Note  Requested Prescriptions     Pending Prescriptions Disp Refills   • amLODIPine (NORVASC) 10 MG tablet [Pharmacy Med Name: AMLODIPINE TAB 10MG] 90 tablet 1     Sig: TAKE 1 TABLET DAILY      Last office visit with prescribing clinician: 11/2/2022   Last telemedicine visit with prescribing clinician: Visit date not found   Next office visit with prescribing clinician: Visit date not found                         Would you like a call back once the refill request has been completed: [] Yes [] No    If the office needs to give you a call back, can they leave a voicemail: [] Yes [] No    Bautista Iniguez MA  02/27/23, 11:28 EST

## 2023-07-27 RX ORDER — METOPROLOL SUCCINATE 25 MG/1
TABLET, EXTENDED RELEASE ORAL
Qty: 30 TABLET | Refills: 0 | Status: SHIPPED | OUTPATIENT
Start: 2023-07-27

## 2023-08-25 RX ORDER — METOPROLOL SUCCINATE 25 MG/1
TABLET, EXTENDED RELEASE ORAL
Qty: 30 TABLET | Refills: 0 | OUTPATIENT
Start: 2023-08-25